# Patient Record
Sex: FEMALE | Race: WHITE | NOT HISPANIC OR LATINO | Employment: PART TIME | URBAN - METROPOLITAN AREA
[De-identification: names, ages, dates, MRNs, and addresses within clinical notes are randomized per-mention and may not be internally consistent; named-entity substitution may affect disease eponyms.]

---

## 2020-02-11 ENCOUNTER — TRANSFERRED RECORDS (OUTPATIENT)
Dept: HEALTH INFORMATION MANAGEMENT | Facility: CLINIC | Age: 30
End: 2020-02-11

## 2020-02-17 ENCOUNTER — TRANSFERRED RECORDS (OUTPATIENT)
Dept: HEALTH INFORMATION MANAGEMENT | Facility: CLINIC | Age: 30
End: 2020-02-17

## 2020-02-18 ENCOUNTER — TRANSFERRED RECORDS (OUTPATIENT)
Dept: HEALTH INFORMATION MANAGEMENT | Facility: CLINIC | Age: 30
End: 2020-02-18

## 2020-02-19 ENCOUNTER — TELEPHONE (OUTPATIENT)
Dept: OTOLARYNGOLOGY | Facility: CLINIC | Age: 30
End: 2020-02-19

## 2020-02-19 NOTE — TELEPHONE ENCOUNTER
FUTURE VISIT INFORMATION      FUTURE VISIT INFORMATION:    Date: 2/20/2020    Time: 9:45AM    Location: [place]  REFERRAL INFORMATION:    Referring provider:      Referring providers clinic:      Reason for visit/diagnosis  otorrhea    RECORDS REQUESTED FROM:       Clinic name Comments Records Status Imaging Status   [facility] CT    [address] [place] [address]    [phone]   02/17/2020 CT POSTERIOR FOSSA EAR WITHOUT IV CONTRAST   Care Everywhere  req 2/19/2020 - PACs   [facility]  10/5/18 MRI Head and CT Sinus Care Everywhere  req 2/19/2020 - PACs   [facility] ENT 2/11/2020, 2/18/2020 notes with Dr Henderson   2/11/2020 Audiogram  Sent to scan 2/19/2020 2/19/2020 9:38AM patient spoke with , patient will get her [facility] ENT recs faxed over. Called [facility] for images and [facility]. [facility] Images will be in PACS shortly, [facility] cannot push, sounds like from  patient will hand carry images in - Amay

## 2020-02-20 ENCOUNTER — PRE VISIT (OUTPATIENT)
Dept: OTOLARYNGOLOGY | Facility: CLINIC | Age: 30
End: 2020-02-20

## 2020-02-20 ENCOUNTER — OFFICE VISIT (OUTPATIENT)
Dept: OTOLARYNGOLOGY | Facility: CLINIC | Age: 30
End: 2020-02-20
Payer: COMMERCIAL

## 2020-02-20 VITALS — SYSTOLIC BLOOD PRESSURE: 129 MMHG | WEIGHT: 173.9 LBS | HEART RATE: 96 BPM | DIASTOLIC BLOOD PRESSURE: 80 MMHG

## 2020-02-20 DIAGNOSIS — H66.001 NON-RECURRENT ACUTE SUPPURATIVE OTITIS MEDIA OF RIGHT EAR WITHOUT SPONTANEOUS RUPTURE OF TYMPANIC MEMBRANE: Primary | ICD-10-CM

## 2020-02-20 RX ORDER — CHLORAL HYDRATE 500 MG
1 CAPSULE ORAL
COMMUNITY

## 2020-02-20 RX ORDER — ASPIRIN 325 MG
TABLET ORAL
COMMUNITY

## 2020-02-20 RX ORDER — CEFDINIR 300 MG/1
CAPSULE ORAL
COMMUNITY
Start: 2020-02-12 | End: 2023-11-28

## 2020-02-20 RX ORDER — CIPROFLOXACIN AND DEXAMETHASONE 3; 1 MG/ML; MG/ML
SUSPENSION/ DROPS AURICULAR (OTIC)
Qty: 7.5 ML | Refills: 2 | Status: SHIPPED | OUTPATIENT
Start: 2020-02-20 | End: 2020-03-01

## 2020-02-20 RX ORDER — TOBRAMYCIN 3 MG/ML
SOLUTION/ DROPS OPHTHALMIC
COMMUNITY
Start: 2020-02-19 | End: 2023-11-28

## 2020-02-20 RX ORDER — ACETAMINOPHEN 325 MG/1
325-650 TABLET ORAL
COMMUNITY

## 2020-02-20 ASSESSMENT — PAIN SCALES - GENERAL: PAINLEVEL: NO PAIN (0)

## 2020-02-20 NOTE — NURSING NOTE
Chief Complaint   Patient presents with     Consult     otorrhea   /80   Pulse 96   Wt 78.9 kg (173 lb 14.4 oz)     Gwendolyn Morales LPN

## 2020-02-20 NOTE — PROGRESS NOTES
Karly Mcneal is a new patient to our clinic.  She is a 29 year old female being seen for a right ear infection.  She reports that she started having right otalgia a few weeks ago and was placed on oral antibiotics.  However, she continued to have pain associated with hearing loss and fullness and was seen by an outside ENT and PE tube was placed about 10 days ago.  She did have otorrhea after tube placement which she reports is improving although not completely resolved.  Due to the continued pain and hearing loss, she is here for a second opinion.  She does report that Tylenol improves the pain.  She reports her left ear feels okay.  No vertigo or balance concerns.  She reports she had some ear problems as a child but minimal as an adult.    Past Medical History:   Diagnosis Date     Pneumonia      Recurrent otitis media        Past Surgical History:   Procedure Laterality Date     ENT SURGERY      tonsillecomy 1998     GI SURGERY      cholecystectomy 2010     PE TUBES      right side placed 2/11/20     TONSILLECTOMY         Family History   Problem Relation Age of Onset     Unknown/Adopted Paternal Grandmother      Cancer Father      Hypertension Father      Cancer Other      Cancer Other      Diabetes Maternal Grandmother      Diabetes Paternal Grandfather      Heart Disease Paternal Grandfather      Hypertension Paternal Grandfather      Hypertension Brother        Social History     Tobacco Use     Smoking status: Never Smoker     Smokeless tobacco: Never Used   Substance Use Topics     Alcohol use: Yes     Drug use: Never       Patient Supplied Answers to Review of Systems   ENT ROS 2/20/2020   Constitutional Problems with sleep   Neurology Dizzy spells, Headache   Psychology Frequently feeling anxious   Gastrointestinal/Genitourinary Unexplained nausea/vomiting   The remainder of the 10 point review of systems is otherwise negative.    Physical examination:  Constitutional:  In no acute distress, appears  stated age  Eyes:  Extraocular movements intact, no spontaneous nystagmus  Ears:  Both ears examined under the microscope.  Right ear canal clear, TM with a patent PE tube with moisture on the TM but no xochitl otorrhea noted, the TM is slightly thickened but not acutely erythematous.  Left ear canal clear, TM intact with an aerated middle ear.  Respiratory:  No increased work of breathing, wheezing or stridor  Musculoskeletal:  Good upper extremity strength  Skin:  No rashes on the head and neck  Neurologic:  House Brackman 1/6 bilaterally, ambulating normally  Psychiatric:  Alert, normal affect, answering questions appropriately    Outside records were reviewed including notes, audiogram and CT.  Audiogram from 2/11 which was after the PE tube was placed showed essentially normal left hearing although possibly with a very slight conductive hearing loss in the low frequencies and a right mild conductive hearing loss, right high volume flat tympanogram and negative pressure left tympanogram, no speech discrimination was performed.  She did have a culture performed of the otorrhea but we do not have those results.  CT temporal bones was reviewed and showed a normal left temporal bone.  Right mastoid well developed and almost fully opacified except a few areas of air, antrum fully opacified, middle ear partially opacified, ossicular chain intact, otic capsule intact, facial nerve in its usual position.    Assessment and plan:  Right otalgia with an acute AOM s/p PE tube placement with post tube otorrhea which has since mostly resolved.  We reassured her that her exam today was consistent with an improving infection and that oral and ototopical therapy was the recommended therapy.  Since her otorrhea has improved, that is an encouraging sign.  We encouraged her to continue to follow with her primary ENT but that it may take some time for the hearing loss to resolve as well as the pain.  She had her questions answered and  was comfortable with the plan.

## 2020-02-20 NOTE — PATIENT INSTRUCTIONS
1. You were seen in the ENT Clinic today by .  If you have any questions or concerns after your appointment, please call   - Option 1: ENT Clinic: [phone]  - Option 2: Francesca ('s Nurse): [phone]    2.   Plan to return to clinic as needed.     3.   Use the Ciprodex ear drops as prescribed.     PRANAV Ma  [facility]  [phone]

## 2020-02-20 NOTE — LETTER
2/20/2020      RE: Karly Mcneal  [address] [address] [facility] [address]       Karly Mcneal is a new patient to our clinic.  She is a 29 year old female being seen for a right ear infection.  She reports that she started having right otalgia a few weeks ago and was placed on oral antibiotics.  However, she continued to have pain associated with hearing loss and fullness and was seen by an outside ENT and PE tube was placed about 10 days ago.  She did have otorrhea after tube placement which she reports is improving although not completely resolved.  Due to the continued pain and hearing loss, she is here for a second opinion.  She does report that Tylenol improves the pain.  She reports her left ear feels okay.  No vertigo or balance concerns.  She reports she had some ear problems as a child but minimal as an adult.    Past Medical History:   Diagnosis Date     Pneumonia      Recurrent otitis media        Past Surgical History:   Procedure Laterality Date     ENT SURGERY      tonsillecomy 1998     GI SURGERY      cholecystectomy 2010     PE TUBES      right side placed 2/11/20     TONSILLECTOMY         Family History   Problem Relation Age of Onset     Unknown/Adopted Paternal Grandmother      Cancer Father      Hypertension Father      Cancer Other      Cancer Other      Diabetes Maternal Grandmother      Diabetes Paternal Grandfather      Heart Disease Paternal Grandfather      Hypertension Paternal Grandfather      Hypertension Brother        Social History     Tobacco Use     Smoking status: Never Smoker     Smokeless tobacco: Never Used   Substance Use Topics     Alcohol use: Yes     Drug use: Never       Patient Supplied Answers to Review of Systems   ENT ROS 2/20/2020   Constitutional Problems with sleep   Neurology Dizzy spells, Headache   Psychology Frequently feeling anxious   Gastrointestinal/Genitourinary Unexplained nausea/vomiting   The remainder of the 10 point review of systems is otherwise  negative.    Physical examination:  Constitutional:  In no acute distress, appears stated age  Eyes:  Extraocular movements intact, no spontaneous nystagmus  Ears:  Both ears examined under the microscope.  Right ear canal clear, TM with a patent PE tube with moisture on the TM but no xochitl otorrhea noted, the TM is slightly thickened but not acutely erythematous.  Left ear canal clear, TM intact with an aerated middle ear.  Respiratory:  No increased work of breathing, wheezing or stridor  Musculoskeletal:  Good upper extremity strength  Skin:  No rashes on the head and neck  Neurologic:  House Brackman 1/6 bilaterally, ambulating normally  Psychiatric:  Alert, normal affect, answering questions appropriately    Outside records were reviewed including notes, audiogram and CT.  Audiogram from 2/11 which was after the PE tube was placed showed essentially normal left hearing although possibly with a very slight conductive hearing loss in the low frequencies and a right mild conductive hearing loss, right high volume flat tympanogram and negative pressure left tympanogram, no speech discrimination was performed.  She did have a culture performed of the otorrhea but we do not have those results.  CT temporal bones was reviewed and showed a normal left temporal bone.  Right mastoid well developed and almost fully opacified except a few areas of air, antrum fully opacified, middle ear partially opacified, ossicular chain intact, otic capsule intact, facial nerve in its usual position.    Assessment and plan:  Right otalgia with an acute AOM s/p PE tube placement with post tube otorrhea which has since mostly resolved.  We reassured her that her exam today was consistent with an improving infection and that oral and ototopical therapy was the recommended therapy.  Since her otorrhea has improved, that is an encouraging sign.  We encouraged her to continue to follow with her primary ENT but that it may take some time for  the hearing loss to resolve as well as the pain.  She had her questions answered and was comfortable with the plan.    Mona Ricks MD

## 2020-02-21 ENCOUNTER — TELEPHONE (OUTPATIENT)
Dept: OTOLARYNGOLOGY | Facility: CLINIC | Age: 30
End: 2020-02-21

## 2020-02-21 NOTE — TELEPHONE ENCOUNTER
Health Call Center    Phone Message    May a detailed message be left on voicemail: yes     Reason for Call: Other: Patient called in and stated she got her results back and there was no growth and her ENT doctor there advised her she should only have to take the medication for 10 days and she is wanting to know if  wants her to continue the medication for the additional days if so please call the patient to discuss and send it to [facility] pharmacy in [place]. Thank you.     Action Taken: Message routed to:  [facility] ([facility] ent    Travel Screening: Not Applicable

## 2020-02-21 NOTE — TELEPHONE ENCOUNTER
Left vm message for pt advising per  if she feels better in 10 days, it is ok to stop drops, if not continue for the 14 days. Script was sent to her pharmacy yesterday and was received by pharmacy, so they should either have it ready or are working on it if they have not notified her. Call back number was left on voicemail, in the event of any further questions or concerns.

## 2020-02-24 ENCOUNTER — MYC MEDICAL ADVICE (OUTPATIENT)
Dept: OTOLARYNGOLOGY | Facility: CLINIC | Age: 30
End: 2020-02-24

## 2020-03-02 NOTE — TELEPHONE ENCOUNTER
Spoke to patient regarding MyChart message concerns. Patient stated she went back to her ENT today and he stated it was still wet but didn't want to culture it. He recommended 7 days of Ciprodex to see if the symptoms would clear. Advised patient that it is a good place to start. Discussed staying with drops in place for 5 minutes without talking or moving if possible to allow the drops to stay in place. We will try this for the 7 days and if no resolution of symptoms to call this nurse back to discuss next steps. Patient stated understanding.

## 2020-03-12 ENCOUNTER — HEALTH MAINTENANCE LETTER (OUTPATIENT)
Age: 30
End: 2020-03-12

## 2021-01-04 ENCOUNTER — HEALTH MAINTENANCE LETTER (OUTPATIENT)
Age: 31
End: 2021-01-04

## 2021-04-25 ENCOUNTER — HEALTH MAINTENANCE LETTER (OUTPATIENT)
Age: 31
End: 2021-04-25

## 2021-10-10 ENCOUNTER — HEALTH MAINTENANCE LETTER (OUTPATIENT)
Age: 31
End: 2021-10-10

## 2022-05-22 ENCOUNTER — HEALTH MAINTENANCE LETTER (OUTPATIENT)
Age: 32
End: 2022-05-22

## 2022-09-18 ENCOUNTER — HEALTH MAINTENANCE LETTER (OUTPATIENT)
Age: 32
End: 2022-09-18

## 2023-06-04 ENCOUNTER — HEALTH MAINTENANCE LETTER (OUTPATIENT)
Age: 33
End: 2023-06-04

## 2023-11-21 ENCOUNTER — TELEPHONE (OUTPATIENT)
Dept: GASTROENTEROLOGY | Facility: CLINIC | Age: 33
End: 2023-11-21

## 2023-11-21 ENCOUNTER — HOSPITAL ENCOUNTER (OUTPATIENT)
Facility: CLINIC | Age: 33
End: 2023-11-21
Attending: INTERNAL MEDICINE | Admitting: INTERNAL MEDICINE
Payer: COMMERCIAL

## 2023-11-21 ENCOUNTER — TRANSCRIBE ORDERS (OUTPATIENT)
Dept: OTHER | Age: 33
End: 2023-11-21

## 2023-11-21 DIAGNOSIS — R13.10 DYSPHAGIA, UNSPECIFIED TYPE: Primary | ICD-10-CM

## 2023-11-21 NOTE — TELEPHONE ENCOUNTER
"Endoscopy Scheduling Screen    Have you had a positive Covid test in the last 14 days?  No    Are you active on MyChart?   Yes - requested letter.     What insurance is in the chart?  Other:  [employer]    Ordering/Referring Provider: APRIL SOSA    (If ordering provider performs procedure, schedule with ordering provider unless otherwise instructed. )    BMI: There is no height or weight on file to calculate BMI. 27.3    Sedation Ordered  moderate sedation.   If patient BMI > 50 do not schedule in ASC.    If patient BMI > 45 do not schedule at [facility].    Are you taking methadone or Suboxone?  No    Are you taking any prescription medications for pain 3 or more times per week?   No    Do you have a history of malignant hyperthermia or adverse reaction to anesthesia?  No    (Females) Are you currently pregnant?   No     Have you been diagnosed or told you have pulmonary hypertension?   No    Do you have an LVAD?  No    Have you been told you have moderate to severe sleep apnea?  No    Have you been told you have COPD, asthma, or any other lung disease?  Yes     What breathing problems do you have?  Reactive activitity      Do you use home oxygen?  No    Have your breathing problems required an ED visit or hospitalization in the last year?  No    Do you have any heart conditions?  No     Have you ever had an organ transplant?   No    Have you ever had or are you awaiting a heart or lung transplant?   No    Have you had a stroke or transient ischemic attack (TIA aka \"mini stroke\" in the last 6 months?   No    Have you been diagnosed with or been told you have cirrhosis of the liver?   No    Are you currently on dialysis?   No    Do you need assistance transferring?   No    BMI: There is no height or weight on file to calculate BMI. 27.3    Is patients BMI > 40 and scheduling location [facility]?  No    Do you take an injectable medication for weight loss or diabetes (excluding insulin)?  No    Do you take the medication " Naltrexone?  No    Do you take blood thinners?  No       Prep   Are you currently on dialysis or do you have chronic kidney disease?  No    Do you have a diagnosis of diabetes?  No    Do you have a diagnosis of cystic fibrosis (CF)?  No    On a regular basis do you go 3 -5 days between bowel movements?  No    BMI > 40?  No    Preferred Pharmacy:    [id number] - [place], [place] - [address] AT [facility] OF [address]  [address]  [place] [address]  Phone: [phone] Fax: [phone]      Final Scheduling Details   Colonoscopy prep sent?  N/A    Procedure scheduled  Upper endoscopy (EGD)    Surgeon:  YOANA     Date of procedure:  12/4/2023     Pre-OP / PAC:   No - Not required for this site.    Location  [facility] - Per order.    Sedation   MAC/Deep Sedation - Per order.      Patient Reminders:   You will receive a call from a Nurse to review instructions and health history.  This assessment must be completed prior to your procedure.  Failure to complete the Nurse assessment may result in the procedure being cancelled.      On the day of your procedure, please designate an adult(s) who can drive you home stay with you for the next 24 hours. The medicines used in the exam will make you sleepy. You will not be able to drive.      You cannot take public transportation, ride share services, or non-medical taxi service without a responsible caregiver.  Medical transport services are allowed with the requirement that a responsible caregiver will receive you at your destination.  We require that drivers and caregivers are confirmed prior to your procedure.

## 2023-11-30 ENCOUNTER — TELEPHONE (OUTPATIENT)
Dept: GASTROENTEROLOGY | Facility: CLINIC | Age: 33
End: 2023-11-30
Payer: COMMERCIAL

## 2023-11-30 NOTE — TELEPHONE ENCOUNTER
Caller: Karly        Rescheduled: Yes  Procedure: Lower Endoscopy [Colonoscopy]   Date: 01/17/2024  Location: [facility]; [address] [facility] , [place] [address]  Surgeon: LONG  Sedation Level Scheduled  MAC,  Reason for Sedation Level Per Location  Prep/Instructions updated and sent: My Chart

## 2023-11-30 NOTE — TELEPHONE ENCOUNTER
Caller: LVM for Karly   Reason for Reschedule/Cancellation (please be detailed, any staff messages or encounters to note?): Patient has covid       Prior to reschedule please review:  Ordering Provider:     Martin Gambino MD in GENERIC EXTERNAL DATA DEPARTMENT     Sedation per order: MAC  Does patient have any ASC Exclusions, please identify?: n      Notes on Cancelled Procedure:  Procedure: Upper Endoscopy [EGD]   Date: 12/04/2023  Location: [facility]; [address] [place] [address] , [place] [address]  Surgeon: Long      Rescheduled: No    CASE IN DEPOT

## 2024-01-11 NOTE — TELEPHONE ENCOUNTER
Sharon Morgan RN  P Endoscopy Scheduling Pool  Called patient to do precall and patient states that she wishes not to do this procedure at this time. Offered phone number for patient to call and reschedule down the road.

## 2024-01-11 NOTE — TELEPHONE ENCOUNTER
Caller: RN spoke with patient  Reason for Reschedule/Cancellation (please be detailed, any staff messages or encounters to note?): Patient does not want procedure at this time.       Prior to reschedule please review:  Ordering Provider: Martin Gambino MD   Sedation per order:  Moderate  Does patient have any ASC Exclusions, please identify?: No      Notes on Cancelled Procedure:  Procedure: Lower Endoscopy [Colonoscopy]   Date: 1/17/2024  Location: [facility]; [address] [place] [address] , [place] [address]  Surgeon: Long      Rescheduled: No